# Patient Record
Sex: FEMALE | Race: WHITE | NOT HISPANIC OR LATINO | URBAN - METROPOLITAN AREA
[De-identification: names, ages, dates, MRNs, and addresses within clinical notes are randomized per-mention and may not be internally consistent; named-entity substitution may affect disease eponyms.]

---

## 2023-03-06 ENCOUNTER — TELEPHONE (OUTPATIENT)
Dept: PEDIATRICS | Facility: CLINIC | Age: 18
End: 2023-03-06
Payer: COMMERCIAL

## 2023-03-09 PROBLEM — Q76.3 CONGENITAL SCOLIOSIS DUE TO ANOMALY OF VERTEBRA: Status: ACTIVE | Noted: 2023-03-09

## 2023-03-09 PROBLEM — J45.909 REACTIVE AIRWAY DISEASE (HHS-HCC): Status: ACTIVE | Noted: 2023-03-09

## 2023-03-09 PROBLEM — N94.6 MENSTRUAL CRAMPS: Status: ACTIVE | Noted: 2023-03-09

## 2023-03-09 PROBLEM — Z20.822 ENCOUNTER FOR LABORATORY TESTING FOR COVID-19 VIRUS: Status: ACTIVE | Noted: 2023-03-09

## 2023-03-09 PROBLEM — H10.13 ALLERGIC CONJUNCTIVITIS OF BOTH EYES: Status: ACTIVE | Noted: 2023-03-09

## 2023-03-09 PROBLEM — K22.2 ESOPHAGEAL STENOSIS: Status: ACTIVE | Noted: 2023-03-09

## 2023-03-09 PROBLEM — J30.9 ALLERGIC RHINITIS: Status: ACTIVE | Noted: 2023-03-09

## 2023-03-09 RX ORDER — FLUTICASONE PROPIONATE 50 MCG
1 SPRAY, SUSPENSION (ML) NASAL DAILY
COMMUNITY
Start: 2021-05-26

## 2023-03-09 RX ORDER — MONTELUKAST SODIUM 10 MG/1
10 TABLET ORAL DAILY
COMMUNITY
Start: 2017-04-26 | End: 2023-05-02 | Stop reason: ALTCHOICE

## 2023-03-09 RX ORDER — ALBUTEROL SULFATE 90 UG/1
2 AEROSOL, METERED RESPIRATORY (INHALATION) EVERY 6 HOURS PRN
COMMUNITY
Start: 2018-01-03

## 2023-03-09 RX ORDER — DESOGESTREL AND ETHINYL ESTRADIOL 0.15-0.03
1 KIT ORAL DAILY
COMMUNITY
Start: 2022-06-13 | End: 2023-05-13 | Stop reason: SDUPTHER

## 2023-03-28 ENCOUNTER — TELEPHONE (OUTPATIENT)
Dept: PEDIATRICS | Facility: CLINIC | Age: 18
End: 2023-03-28
Payer: COMMERCIAL

## 2023-04-10 ENCOUNTER — APPOINTMENT (OUTPATIENT)
Dept: PEDIATRICS | Facility: CLINIC | Age: 18
End: 2023-04-10
Payer: COMMERCIAL

## 2023-04-29 PROBLEM — Z20.822 ENCOUNTER FOR LABORATORY TESTING FOR COVID-19 VIRUS: Status: RESOLVED | Noted: 2023-03-09 | Resolved: 2023-04-29

## 2023-04-29 PROBLEM — Q76.3 CONGENITAL SCOLIOSIS DUE TO ANOMALY OF VERTEBRA: Status: RESOLVED | Noted: 2023-03-09 | Resolved: 2023-04-29

## 2023-04-29 RX ORDER — NORETHINDRONE ACETATE AND ETHINYL ESTRADIOL, AND FERROUS FUMARATE 1.5-30(21)
1 KIT ORAL DAILY
COMMUNITY
Start: 2023-03-13 | End: 2023-05-02 | Stop reason: ALTCHOICE

## 2023-05-02 ENCOUNTER — OFFICE VISIT (OUTPATIENT)
Dept: PEDIATRICS | Facility: CLINIC | Age: 18
End: 2023-05-02
Payer: COMMERCIAL

## 2023-05-02 VITALS
HEART RATE: 98 BPM | WEIGHT: 127 LBS | HEIGHT: 62 IN | DIASTOLIC BLOOD PRESSURE: 80 MMHG | SYSTOLIC BLOOD PRESSURE: 117 MMHG | BODY MASS INDEX: 23.37 KG/M2

## 2023-05-02 DIAGNOSIS — L70.9 ACNE, UNSPECIFIED ACNE TYPE: ICD-10-CM

## 2023-05-02 DIAGNOSIS — Z00.00 WELLNESS EXAMINATION: Primary | ICD-10-CM

## 2023-05-02 PROCEDURE — 99395 PREV VISIT EST AGE 18-39: CPT | Performed by: PEDIATRICS

## 2023-05-02 RX ORDER — CLINDAMYCIN PHOSPHATE AND BENZOYL PEROXIDE 10; 50 MG/G; MG/G
GEL TOPICAL
Qty: 45 G | Refills: 11 | Status: SHIPPED | OUTPATIENT
Start: 2023-05-02

## 2023-05-02 NOTE — PROGRESS NOTES
"Subjective   Patient ID: Anjali Harrell is a 18 y.o. female who presents for well child visit    Nutrition: healthy diet  Sleep: no issues  School;  performing well.  No academic or behavioral concerns.  At [facility] program  Menstruation: regular.  Cramps better with OCPs  Work:  [employer]  Sports/activities:  Smoking/vaping: no  Drug use: no  Sexually active? Yes.  Uses OCPs and condoms    Other: last esophageal dilation 4/22.  Has yearly followup with GI      Objective   /80   Pulse 98   Ht 1.575 m (5' 2\")   Wt 57.6 kg (127 lb)   BMI 23.23 kg/m²   BSA: 1.59 meters squared  Growth percentiles: Facility age limit for growth %deangelo is 20 years. Facility age limit for growth %deangelo is 20 years.     Physical Exam  Constitutional:       General: She is not in acute distress.  HENT:      Right Ear: Tympanic membrane normal.      Left Ear: Tympanic membrane normal.      Mouth/Throat:      Pharynx: Oropharynx is clear.   Eyes:      Conjunctiva/sclera: Conjunctivae normal.   Cardiovascular:      Rate and Rhythm: Normal rate.      Heart sounds: No murmur heard.  Pulmonary:      Effort: No respiratory distress.      Breath sounds: Normal breath sounds.   Abdominal:      Palpations: There is no mass.   Musculoskeletal:         General: Normal range of motion.   Lymphadenopathy:      Cervical: No cervical adenopathy.   Skin:     Findings: No rash.   Neurological:      General: No focal deficit present.      Mental Status: She is alert.         Assessment/Plan   Healthy young adult  Vaccines:  due for Tdap but has caresource so cannot get here today  Allergies are good off singulair  Followed by GI for esophageal issues  Discussed transition to adult medical care  Pt concerned about adhd.  [facility]  Acne: trial duac gel qhs  Discussed healthy diet and exercise      Yovany George MD     "

## 2023-05-13 DIAGNOSIS — N94.6 MENSTRUAL CRAMPS: Primary | ICD-10-CM

## 2023-05-13 RX ORDER — DESOGESTREL AND ETHINYL ESTRADIOL 0.15-0.03
1 KIT ORAL DAILY
Qty: 28 TABLET | Refills: 11 | Status: SHIPPED | OUTPATIENT
Start: 2023-05-13 | End: 2024-04-15 | Stop reason: SDUPTHER

## 2023-06-15 ENCOUNTER — OFFICE VISIT (OUTPATIENT)
Dept: PEDIATRICS | Facility: CLINIC | Age: 18
End: 2023-06-15
Payer: COMMERCIAL

## 2023-06-15 ENCOUNTER — TELEPHONE (OUTPATIENT)
Dept: PEDIATRICS | Facility: CLINIC | Age: 18
End: 2023-06-15

## 2023-06-15 VITALS — BODY MASS INDEX: 22.28 KG/M2 | TEMPERATURE: 99.8 F | WEIGHT: 121.8 LBS

## 2023-06-15 DIAGNOSIS — J32.9 SINUSITIS, UNSPECIFIED CHRONICITY, UNSPECIFIED LOCATION: ICD-10-CM

## 2023-06-15 DIAGNOSIS — B34.9 VIRAL ILLNESS: Primary | ICD-10-CM

## 2023-06-15 PROBLEM — K20.0 EOSINOPHILIC ESOPHAGITIS: Status: ACTIVE | Noted: 2022-01-27

## 2023-06-15 PROBLEM — M41.9 SCOLIOSIS: Status: ACTIVE | Noted: 2021-12-27

## 2023-06-15 PROBLEM — G43.909 MIGRAINE WITHOUT STATUS MIGRAINOSUS, NOT INTRACTABLE: Status: ACTIVE | Noted: 2021-12-27

## 2023-06-15 PROBLEM — J45.20 MILD INTERMITTENT ASTHMA WITHOUT COMPLICATION (HHS-HCC): Status: ACTIVE | Noted: 2021-12-27

## 2023-06-15 LAB — POC RAPID STREP: NEGATIVE

## 2023-06-15 PROCEDURE — 87651 STREP A DNA AMP PROBE: CPT

## 2023-06-15 PROCEDURE — 99213 OFFICE O/P EST LOW 20 MIN: CPT | Performed by: STUDENT IN AN ORGANIZED HEALTH CARE EDUCATION/TRAINING PROGRAM

## 2023-06-15 PROCEDURE — 87880 STREP A ASSAY W/OPTIC: CPT | Performed by: STUDENT IN AN ORGANIZED HEALTH CARE EDUCATION/TRAINING PROGRAM

## 2023-06-15 RX ORDER — PANTOPRAZOLE SODIUM 40 MG/1
40 TABLET, DELAYED RELEASE ORAL 2 TIMES DAILY
COMMUNITY
Start: 2021-11-27

## 2023-06-15 RX ORDER — MONTELUKAST SODIUM 10 MG/1
10 TABLET ORAL NIGHTLY
COMMUNITY

## 2023-06-15 RX ORDER — FLUTICASONE PROPIONATE 50 MCG
1 SPRAY, SUSPENSION (ML) NASAL DAILY
COMMUNITY
End: 2024-04-22 | Stop reason: ALTCHOICE

## 2023-06-15 RX ORDER — DIPHENHYDRAMINE HCL 25 MG
50 TABLET ORAL EVERY 6 HOURS PRN
COMMUNITY
Start: 2017-06-16 | End: 2024-04-26 | Stop reason: ALTCHOICE

## 2023-06-15 RX ORDER — AMOXICILLIN AND CLAVULANATE POTASSIUM 875; 125 MG/1; MG/1
1 TABLET, FILM COATED ORAL 2 TIMES DAILY
Qty: 20 TABLET | Refills: 0 | Status: SHIPPED | OUTPATIENT
Start: 2023-06-15 | End: 2023-06-16

## 2023-06-15 NOTE — PROGRESS NOTES
"Subjective   Patient ID: Anjali Harrell is a 18 y.o. female who presents for Cough and Nasal Congestion.    Sick for 3-4 days  Sore throat, congestion, cough  Using albuterol inhaler without relief (last albuterol yesterday evening)  Coughing up mucus  Temperature 99  Drinking okay, urinating normally  Energy is \"terrible\" - can't get out of bed      Objective   Temp 37.7 °C (99.8 °F)   Wt 55.2 kg (121 lb 12.8 oz)   BMI 22.28 kg/m²   BSA: 1.55 meters squared  Growth percentiles: Facility age limit for growth %deangelo is 20 years. Facility age limit for growth %deangelo is 20 years.     Physical Exam  Constitutional:       Appearance: Normal appearance.   HENT:      Head: Normocephalic and atraumatic.      Right Ear: Tympanic membrane normal.      Left Ear: Tympanic membrane normal.      Nose: Nose normal.      Mouth/Throat:      Mouth: Mucous membranes are moist.   Eyes:      Conjunctiva/sclera: Conjunctivae normal.   Cardiovascular:      Heart sounds: Normal heart sounds. No murmur heard.  Pulmonary:      Effort: Pulmonary effort is normal.      Breath sounds: Normal breath sounds. No wheezing, rhonchi or rales.   Abdominal:      General: Abdomen is flat. Bowel sounds are normal.      Palpations: Abdomen is soft.   Musculoskeletal:      Cervical back: Normal range of motion and neck supple.   Neurological:      Mental Status: She is alert.               Assessment/Plan   18 y.o., otherwise healthy female presenting with viral illness. Discussed supportive care, reasons to return to care. If no improvement after 1 week of illness will consider treating as sinus infection.   Rapid strep negative; will follow PCR    ADDENDUM: Mom called back and informed me that symptoms have been going on for two weeks, and getting worse recently. In this case, will treat as sinusitis with augmentin. Call with any worsening symptoms or new concerns.     ADDENDUM: Received a call that lab did not receive strep test. Given that patient " is on augmentin, which would cover strep throat should test be positive, no need to repeat.     ADDENDUM: Despite communication from lab, PCR has resulted and is negative.     Problem List Items Addressed This Visit    None      Senia Caldwell MD

## 2023-06-16 ENCOUNTER — TELEPHONE (OUTPATIENT)
Dept: PEDIATRICS | Facility: CLINIC | Age: 18
End: 2023-06-16
Payer: COMMERCIAL

## 2023-06-16 DIAGNOSIS — R05.1 ACUTE COUGH: Primary | ICD-10-CM

## 2023-06-16 LAB — GROUP A STREP, PCR: NOT DETECTED

## 2023-06-16 RX ORDER — AMOXICILLIN AND CLAVULANATE POTASSIUM 600; 42.9 MG/5ML; MG/5ML
POWDER, FOR SUSPENSION ORAL
Qty: 145 ML | Refills: 0 | Status: SHIPPED | OUTPATIENT
Start: 2023-06-16 | End: 2024-04-22 | Stop reason: ALTCHOICE

## 2023-09-20 ENCOUNTER — OFFICE VISIT (OUTPATIENT)
Dept: PEDIATRICS | Facility: CLINIC | Age: 18
End: 2023-09-20
Payer: COMMERCIAL

## 2023-09-20 VITALS
HEIGHT: 63 IN | HEART RATE: 105 BPM | WEIGHT: 115.7 LBS | DIASTOLIC BLOOD PRESSURE: 71 MMHG | SYSTOLIC BLOOD PRESSURE: 98 MMHG | BODY MASS INDEX: 20.5 KG/M2

## 2023-09-20 DIAGNOSIS — J02.9 VIRAL PHARYNGITIS: Primary | ICD-10-CM

## 2023-09-20 DIAGNOSIS — R59.9 REACTIVE LYMPHADENOPATHY: ICD-10-CM

## 2023-09-20 LAB
GROUP A STREP, PCR: NOT DETECTED
POC RAPID STREP: NEGATIVE

## 2023-09-20 PROCEDURE — 87651 STREP A DNA AMP PROBE: CPT

## 2023-09-20 PROCEDURE — 87880 STREP A ASSAY W/OPTIC: CPT | Performed by: PEDIATRICS

## 2023-09-20 PROCEDURE — 99213 OFFICE O/P EST LOW 20 MIN: CPT | Performed by: PEDIATRICS

## 2023-09-20 NOTE — PROGRESS NOTES
"Subjective   Patient ID: Anjali Harrell is a 18 y.o. female who presents for Well Child.  Today she is accompanied by alone.     HPI    ILLNESS X 1 WEEK    Began with congestion- resolved  Now terrible sore throat  No fevers  Yesterday started with swollen neck  A bit better today but not resolved    Review of systems negative unless otherwise indicated in HPI    Objective   BP 98/71   Pulse 105   Ht 1.588 m (5' 2.5\")   Wt 52.5 kg (115 lb 11.2 oz)   BMI 20.82 kg/m²     Physical Exam  General: alert, active, in no acute distress  Hydration: well-hydrated, mucous membranes moist, good skin turgor  Eyes: conjunctiva clear  Ears: TM's normal, external auditory canals are clear   Nose: clear, no discharge  Throat: moist mucous membranes with MODERATE erythema, NO exudates or petechiae, no post-nasal drainage seen  Neck: Mild B lymphadenopathy- R > L both < 2cm  Lungs: clear to auscultation, no wheezing, crackles or rhonchi, breathing unlabored  Heart: Normal PMI. regular rate and rhythm, normal S1, S2, no murmurs or gallops.     Assessment/Plan   Problem List Items Addressed This Visit    None  Visit Diagnoses       Viral pharyngitis    -  Primary    Relevant Orders    Group A Streptococcus, PCR    POCT rapid strep A manually resulted    Reactive lymphadenopathy              Viral pharyngitis with reactive LAD  Supportive Care  Call if worse, not improved, new fever  Strep PCR  Exam NOT classic for mono but offered testing- pt refused  Janell Shelton MD   "

## 2023-11-30 DIAGNOSIS — J30.9 ALLERGIC RHINITIS, UNSPECIFIED SEASONALITY, UNSPECIFIED TRIGGER: Primary | ICD-10-CM

## 2024-01-08 ENCOUNTER — OFFICE VISIT (OUTPATIENT)
Dept: PEDIATRICS | Facility: CLINIC | Age: 19
End: 2024-01-08
Payer: COMMERCIAL

## 2024-01-08 VITALS — BODY MASS INDEX: 21.08 KG/M2 | WEIGHT: 117.1 LBS

## 2024-01-08 DIAGNOSIS — M25.512 ACUTE PAIN OF LEFT SHOULDER: Primary | ICD-10-CM

## 2024-01-08 PROCEDURE — 99213 OFFICE O/P EST LOW 20 MIN: CPT | Performed by: PEDIATRICS

## 2024-01-08 PROCEDURE — A4565 SLINGS: HCPCS | Performed by: PEDIATRICS

## 2024-01-08 NOTE — LETTER
January 8, 2024     Patient: Anjali Harrell   YOB: 2005   Date of Visit: 1/8/2024       To Whom It May Concern:    Anjali Harrell was seen in my clinic on 1/8/2024 at 11:30 am. Please excuse Anjali for her absence from work on this day to make the appointment.    Anjali has been instructed not to use her left shoulder until seen by an orthopedic surgeon.    If you have any questions or concerns, please don't hesitate to call.         Sincerely,         Janell Shelton MD        CC: No Recipients

## 2024-01-08 NOTE — PROGRESS NOTES
Subjective   Patient ID: Anjali Harrell is a 18 y.o. female who presents for Shoulder Pain.  Today she is accompanied by alone.     HPI    Left should pain x 3 weeks  No injury  Works at [employer]  Sander has her working an area that requires her to lift her left arm often to reach something  Pt feels this is the reason for pain  It is a repetitive movement she is doing all day at work  Occasional Tingling in hand  Occasionally the pain wakes her from sleep  Normal range of motion  Hurts to raise her arm      Review of systems negative unless otherwise indicated in HPI    Objective   Wt 53.1 kg (117 lb 1.6 oz)   BMI 21.08 kg/m²     Physical Exam  General: alert, active, in no acute distress  Lungs: clear to auscultation, no wheezing, crackles or rhonchi, breathing unlabored  Heart: Normal PMI. regular rate and rhythm, normal S1, S2, no murmurs or gallops.   Left shoulder: normal on inspection/looks symmetrical to right.  No bruising.  Full ROM.  Increased pain when asked to reach up.  Points to shoulder blade as source of pain.  Strength 4/5 on testing in shoulder.  Attributed to pain.  Normal elbow and wrist strength    Assessment/Plan   Problem List Items Addressed This Visit    None  Visit Diagnoses       Acute pain of left shoulder    -  Primary    Relevant Orders    Referral to Orthopaedic Surgery          Left shoulder pain  Ortho  Placed in sling today- advised not to sleep in it  Note for work that she should not use left shoulder until seen by ortho    Janell Shelton MD

## 2024-01-24 ENCOUNTER — OFFICE VISIT (OUTPATIENT)
Dept: ORTHOPEDIC SURGERY | Facility: HOSPITAL | Age: 19
End: 2024-01-24
Payer: COMMERCIAL

## 2024-01-24 ENCOUNTER — HOSPITAL ENCOUNTER (OUTPATIENT)
Dept: RADIOLOGY | Facility: HOSPITAL | Age: 19
Discharge: HOME | End: 2024-01-24
Payer: COMMERCIAL

## 2024-01-24 DIAGNOSIS — M25.512 LEFT SHOULDER PAIN, UNSPECIFIED CHRONICITY: ICD-10-CM

## 2024-01-24 DIAGNOSIS — M25.512 ACUTE PAIN OF LEFT SHOULDER: ICD-10-CM

## 2024-01-24 PROCEDURE — 99214 OFFICE O/P EST MOD 30 MIN: CPT | Performed by: ORTHOPAEDIC SURGERY

## 2024-01-24 PROCEDURE — 2500000004 HC RX 250 GENERAL PHARMACY W/ HCPCS (ALT 636 FOR OP/ED): Performed by: ORTHOPAEDIC SURGERY

## 2024-01-24 PROCEDURE — 73030 X-RAY EXAM OF SHOULDER: CPT | Mod: LT

## 2024-01-24 PROCEDURE — 73030 X-RAY EXAM OF SHOULDER: CPT | Mod: LEFT SIDE | Performed by: RADIOLOGY

## 2024-01-24 PROCEDURE — 99204 OFFICE O/P NEW MOD 45 MIN: CPT | Performed by: ORTHOPAEDIC SURGERY

## 2024-01-24 PROCEDURE — 20610 DRAIN/INJ JOINT/BURSA W/O US: CPT | Performed by: ORTHOPAEDIC SURGERY

## 2024-01-24 PROCEDURE — 2500000005 HC RX 250 GENERAL PHARMACY W/O HCPCS: Performed by: ORTHOPAEDIC SURGERY

## 2024-01-24 RX ORDER — LIDOCAINE HYDROCHLORIDE 10 MG/ML
4 INJECTION INFILTRATION; PERINEURAL
Status: COMPLETED | OUTPATIENT
Start: 2024-01-24 | End: 2024-01-24

## 2024-01-24 RX ORDER — TRIAMCINOLONE ACETONIDE 40 MG/ML
40 INJECTION, SUSPENSION INTRA-ARTICULAR; INTRAMUSCULAR
Status: COMPLETED | OUTPATIENT
Start: 2024-01-24 | End: 2024-01-24

## 2024-01-24 RX ADMIN — TRIAMCINOLONE ACETONIDE 40 MG: 400 INJECTION, SUSPENSION INTRA-ARTICULAR; INTRAMUSCULAR at 13:24

## 2024-01-24 RX ADMIN — LIDOCAINE HYDROCHLORIDE 4 ML: 10 INJECTION, SOLUTION INFILTRATION; PERINEURAL at 13:24

## 2024-01-24 NOTE — PROGRESS NOTES
Patient has left shoulder pain around the trapezius worse with elevation and disruptive of her sleep no neck pain.  The pain does go down her arm to her left hand at times.  She is not affecting paresthesias or weakness.  She has a job that requires repetitive overhead use of her arms.    The patient is pleasant and cooperative.  The patient is alert and oriented ×3.  Auditory function is intact.  The patient is a good historian.  The patient is not in acute distress.  Eye exam significant for nonicteric sclera, intact ocular muscle movement.  Breathing is rhythmic symmetric and nonlabored.  Left upper extremity integument intact no lesions scars laceration abrasions or contusions left radial pulse palpable brisk capillary refill light touch sensation intact over the shoulder arm forearm and hand cervical spine motion is painless including Spurling's which is negative.  Patient has nearly full range of motion of her shoulder but she has a painful arc of motion external rotation abduction painful internal rotation abduction painful motor power abduction 5 external rotation 5 internal rotation 5  strength 5 also painful on resisted motion.  No apprehension.    Radiographs appear normal no fracture dislocation subluxation or arthritic degenerative changes.    Left shoulder pain    Patient has left shoulder pain differential diagnosis includes cervical radiculitis, subacromial impingement and trapezial strain.  Favoring diagnosis of subacromial origin of pain, we recommended subacromial injection injection was performed today.  Patient has been asked to call in 1 week to report results of this.  If she does not get relief of most likely like to initiate another course of anti-inflammatory medicine and physical therapy before further imaging.    This was dictated using voice recognition software and not corrected for grammatical or spelling errors.  L Inj/Asp: L subacromial bursa on 1/24/2024 1:24 PM  Indications:  pain  Details: 22 G needle, posterior approach  Medications: 40 mg triamcinolone acetonide 40 mg/mL; 4 mL lidocaine 10 mg/mL (1 %)  Procedure, treatment alternatives, risks and benefits explained, specific risks discussed. Consent was given by the patient.

## 2024-01-24 NOTE — LETTER
January 24, 2024     Patient: Anjali Harrell   YOB: 2005   Date of Visit: 1/24/2024       To Whom It May Concern:    Anjali Harrell was seen in my clinic for her left shoulder on 1/24/24.     If you have any questions or concerns, please don't hesitate to call.         Sincerely,        Kenji Hobson MD    CC: No Recipients

## 2024-04-15 DIAGNOSIS — N94.6 MENSTRUAL CRAMPS: ICD-10-CM

## 2024-04-15 RX ORDER — DESOGESTREL AND ETHINYL ESTRADIOL 0.15-0.03
1 KIT ORAL DAILY
Qty: 28 TABLET | Refills: 11 | Status: SHIPPED | OUTPATIENT
Start: 2024-04-15

## 2024-04-22 PROBLEM — J45.20 MILD INTERMITTENT ASTHMA WITHOUT COMPLICATION (HHS-HCC): Status: RESOLVED | Noted: 2021-12-27 | Resolved: 2024-04-22

## 2024-04-26 ENCOUNTER — OFFICE VISIT (OUTPATIENT)
Dept: PEDIATRICS | Facility: CLINIC | Age: 19
End: 2024-04-26
Payer: COMMERCIAL

## 2024-04-26 VITALS
HEART RATE: 71 BPM | HEIGHT: 62 IN | SYSTOLIC BLOOD PRESSURE: 124 MMHG | DIASTOLIC BLOOD PRESSURE: 82 MMHG | BODY MASS INDEX: 21.31 KG/M2 | WEIGHT: 115.8 LBS

## 2024-04-26 DIAGNOSIS — Z00.00 WELLNESS EXAMINATION: Primary | ICD-10-CM

## 2024-04-26 PROCEDURE — 99395 PREV VISIT EST AGE 18-39: CPT | Performed by: PEDIATRICS

## 2024-04-26 NOTE — PROGRESS NOTES
"Subjective   Patient ID: Anjali Harrell is a 19 y.o. female who presents for well child visit    Nutrition: healthy diet  Sleep: no issues  School;  performing well.  No academic or behavioral concerns    Graduated college.  Starting radiology technician program at [employer]     Living at home  Menstruation: regular menses.  On OCPs  Work:   Sports/activities:  Smoking/vaping: no  Drug use: no  Sexually active? No     Other:      Objective   /82   Pulse 71   Ht 1.562 m (5' 1.5\")   Wt 52.5 kg (115 lb 12.8 oz)   BMI 21.53 kg/m²   BSA: 1.51 meters squared  Growth percentiles: Facility age limit for growth %deangelo is 20 years. Facility age limit for growth %deangelo is 20 years.     Physical Exam  Constitutional:       General: She is not in acute distress.  HENT:      Right Ear: Tympanic membrane normal.      Left Ear: Tympanic membrane normal.      Mouth/Throat:      Pharynx: Oropharynx is clear.   Eyes:      Conjunctiva/sclera: Conjunctivae normal.   Cardiovascular:      Rate and Rhythm: Normal rate.      Heart sounds: No murmur heard.  Pulmonary:      Effort: No respiratory distress.      Breath sounds: Normal breath sounds.   Abdominal:      Palpations: There is no mass.   Musculoskeletal:         General: Normal range of motion.   Lymphadenopathy:      Cervical: No cervical adenopathy.   Skin:     Findings: No rash.   Neurological:      General: No focal deficit present.      Mental Status: She is alert.         Assessment/Plan   Healthy young adult  Vaccines:  up to date for age  Periods regulated on OCPs  Asthma: mild intermittent  Discussed healthy diet and exercise      Yovany George MD       "

## 2024-06-07 DIAGNOSIS — J30.9 ALLERGIC RHINITIS, UNSPECIFIED SEASONALITY, UNSPECIFIED TRIGGER: ICD-10-CM

## 2024-07-08 ENCOUNTER — APPOINTMENT (OUTPATIENT)
Dept: RADIOLOGY | Facility: HOSPITAL | Age: 19
End: 2024-07-08
Payer: COMMERCIAL

## 2024-07-08 ENCOUNTER — HOSPITAL ENCOUNTER (EMERGENCY)
Facility: HOSPITAL | Age: 19
Discharge: HOME | End: 2024-07-08
Attending: STUDENT IN AN ORGANIZED HEALTH CARE EDUCATION/TRAINING PROGRAM
Payer: COMMERCIAL

## 2024-07-08 VITALS
TEMPERATURE: 98.2 F | RESPIRATION RATE: 20 BRPM | SYSTOLIC BLOOD PRESSURE: 129 MMHG | OXYGEN SATURATION: 97 % | BODY MASS INDEX: 23 KG/M2 | WEIGHT: 125 LBS | HEIGHT: 62 IN | HEART RATE: 86 BPM | DIASTOLIC BLOOD PRESSURE: 82 MMHG

## 2024-07-08 DIAGNOSIS — R55 SYNCOPE, UNSPECIFIED SYNCOPE TYPE: Primary | ICD-10-CM

## 2024-07-08 LAB
ALBUMIN SERPL BCP-MCNC: 4.5 G/DL (ref 3.4–5)
ALP SERPL-CCNC: 57 U/L (ref 33–110)
ALT SERPL W P-5'-P-CCNC: 15 U/L (ref 7–45)
ANION GAP SERPL CALC-SCNC: 12 MMOL/L (ref 10–20)
AST SERPL W P-5'-P-CCNC: 13 U/L (ref 9–39)
B-HCG SERPL-ACNC: <2 MIU/ML
BASOPHILS # BLD AUTO: 0.15 X10*3/UL (ref 0–0.1)
BASOPHILS NFR BLD AUTO: 1.3 %
BILIRUB SERPL-MCNC: 0.6 MG/DL (ref 0–1.2)
BUN SERPL-MCNC: 13 MG/DL (ref 6–23)
CALCIUM SERPL-MCNC: 9.7 MG/DL (ref 8.6–10.3)
CARDIAC TROPONIN I PNL SERPL HS: <3 NG/L (ref 0–13)
CHLORIDE SERPL-SCNC: 101 MMOL/L (ref 98–107)
CO2 SERPL-SCNC: 29 MMOL/L (ref 21–32)
CREAT SERPL-MCNC: 0.63 MG/DL (ref 0.5–1.05)
EGFRCR SERPLBLD CKD-EPI 2021: >90 ML/MIN/1.73M*2
EOSINOPHIL # BLD AUTO: 0.79 X10*3/UL (ref 0–0.7)
EOSINOPHIL NFR BLD AUTO: 6.9 %
ERYTHROCYTE [DISTWIDTH] IN BLOOD BY AUTOMATED COUNT: 12.3 % (ref 11.5–14.5)
GLUCOSE SERPL-MCNC: 90 MG/DL (ref 74–99)
HCT VFR BLD AUTO: 42.9 % (ref 36–46)
HGB BLD-MCNC: 14.7 G/DL (ref 12–16)
IMM GRANULOCYTES # BLD AUTO: 0.03 X10*3/UL (ref 0–0.7)
IMM GRANULOCYTES NFR BLD AUTO: 0.3 % (ref 0–0.9)
LYMPHOCYTES # BLD AUTO: 1.96 X10*3/UL (ref 1.2–4.8)
LYMPHOCYTES NFR BLD AUTO: 17 %
MAGNESIUM SERPL-MCNC: 1.96 MG/DL (ref 1.6–2.4)
MCH RBC QN AUTO: 30.5 PG (ref 26–34)
MCHC RBC AUTO-ENTMCNC: 34.3 G/DL (ref 32–36)
MCV RBC AUTO: 89 FL (ref 80–100)
MONOCYTES # BLD AUTO: 0.91 X10*3/UL (ref 0.1–1)
MONOCYTES NFR BLD AUTO: 7.9 %
NEUTROPHILS # BLD AUTO: 7.66 X10*3/UL (ref 1.2–7.7)
NEUTROPHILS NFR BLD AUTO: 66.6 %
NRBC BLD-RTO: 0 /100 WBCS (ref 0–0)
PLATELET # BLD AUTO: 320 X10*3/UL (ref 150–450)
POTASSIUM SERPL-SCNC: 4.4 MMOL/L (ref 3.5–5.3)
PROT SERPL-MCNC: 7.6 G/DL (ref 6.4–8.2)
RBC # BLD AUTO: 4.82 X10*6/UL (ref 4–5.2)
SARS-COV-2 RNA RESP QL NAA+PROBE: NOT DETECTED
SODIUM SERPL-SCNC: 138 MMOL/L (ref 136–145)
WBC # BLD AUTO: 11.5 X10*3/UL (ref 4.4–11.3)

## 2024-07-08 PROCEDURE — 71045 X-RAY EXAM CHEST 1 VIEW: CPT

## 2024-07-08 PROCEDURE — 2500000004 HC RX 250 GENERAL PHARMACY W/ HCPCS (ALT 636 FOR OP/ED): Performed by: STUDENT IN AN ORGANIZED HEALTH CARE EDUCATION/TRAINING PROGRAM

## 2024-07-08 PROCEDURE — 96361 HYDRATE IV INFUSION ADD-ON: CPT

## 2024-07-08 PROCEDURE — 84702 CHORIONIC GONADOTROPIN TEST: CPT | Performed by: PHYSICIAN ASSISTANT

## 2024-07-08 PROCEDURE — 87635 SARS-COV-2 COVID-19 AMP PRB: CPT | Performed by: STUDENT IN AN ORGANIZED HEALTH CARE EDUCATION/TRAINING PROGRAM

## 2024-07-08 PROCEDURE — 36415 COLL VENOUS BLD VENIPUNCTURE: CPT | Performed by: PHYSICIAN ASSISTANT

## 2024-07-08 PROCEDURE — 84484 ASSAY OF TROPONIN QUANT: CPT | Performed by: STUDENT IN AN ORGANIZED HEALTH CARE EDUCATION/TRAINING PROGRAM

## 2024-07-08 PROCEDURE — 83735 ASSAY OF MAGNESIUM: CPT | Performed by: PHYSICIAN ASSISTANT

## 2024-07-08 PROCEDURE — 71045 X-RAY EXAM CHEST 1 VIEW: CPT | Performed by: RADIOLOGY

## 2024-07-08 PROCEDURE — 85025 COMPLETE CBC W/AUTO DIFF WBC: CPT | Performed by: PHYSICIAN ASSISTANT

## 2024-07-08 PROCEDURE — 70450 CT HEAD/BRAIN W/O DYE: CPT | Performed by: RADIOLOGY

## 2024-07-08 PROCEDURE — 96374 THER/PROPH/DIAG INJ IV PUSH: CPT

## 2024-07-08 PROCEDURE — 99285 EMERGENCY DEPT VISIT HI MDM: CPT | Mod: 25

## 2024-07-08 PROCEDURE — 70450 CT HEAD/BRAIN W/O DYE: CPT

## 2024-07-08 PROCEDURE — 80053 COMPREHEN METABOLIC PANEL: CPT | Performed by: PHYSICIAN ASSISTANT

## 2024-07-08 RX ORDER — KETOROLAC TROMETHAMINE 30 MG/ML
15 INJECTION, SOLUTION INTRAMUSCULAR; INTRAVENOUS ONCE
Status: COMPLETED | OUTPATIENT
Start: 2024-07-08 | End: 2024-07-08

## 2024-07-08 ASSESSMENT — COLUMBIA-SUICIDE SEVERITY RATING SCALE - C-SSRS
6. HAVE YOU EVER DONE ANYTHING, STARTED TO DO ANYTHING, OR PREPARED TO DO ANYTHING TO END YOUR LIFE?: NO
2. HAVE YOU ACTUALLY HAD ANY THOUGHTS OF KILLING YOURSELF?: NO
1. IN THE PAST MONTH, HAVE YOU WISHED YOU WERE DEAD OR WISHED YOU COULD GO TO SLEEP AND NOT WAKE UP?: NO

## 2024-07-08 ASSESSMENT — LIFESTYLE VARIABLES
HAVE YOU EVER FELT YOU SHOULD CUT DOWN ON YOUR DRINKING: NO
EVER FELT BAD OR GUILTY ABOUT YOUR DRINKING: NO
EVER HAD A DRINK FIRST THING IN THE MORNING TO STEADY YOUR NERVES TO GET RID OF A HANGOVER: NO
HAVE PEOPLE ANNOYED YOU BY CRITICIZING YOUR DRINKING: NO
TOTAL SCORE: 0

## 2024-07-08 NOTE — ED PROVIDER NOTES
EMERGENCY MEDICINE EVALUATION NOTE    History of Present Illness     Chief Complaint:   Chief Complaint   Patient presents with    Syncope     Pt arrives private auto from work. States syncopal episode @ work this afternoon. Denies medical hx. States felt light-headed prior to this happening. States she may have hit head when falling.        HPI: Anjali Harrell is a 19 y.o. female who presents with complaint of syncope.  Patient states she woke up this morning and throughout the day she has had intermittent hot flashes with subjective fevers and chills.  She also admits 1 week of rhinorrhea and congestion.  She states she did not feel well all throughout today and while she was at work she attempted to lay her head down and then passed out for an unknown duration of time.  States she woke up on the floor.  She does admit to head trauma and some mild tenderness to the right lateral scalp.  She denies any neck pain, chest pain, current pregnancy, dysuria, hematuria.  States she did have some watery diarrhea although denies any abdominal pain.  She states she did have some intermittent shortness of breath throughout the day.    Previous History     Past Medical History:   Diagnosis Date    Carbuncle, unspecified 11/09/2018    Recurrent boils    Chronic sinusitis, unspecified 05/22/2018    Frequent sinus infections    Concussion without loss of consciousness, initial encounter 01/25/2017    Concussion without loss of consciousness, initial encounter    Congenital scoliosis due to anomaly of vertebra 03/09/2023    Cutaneous abscess, unspecified 08/30/2018    Abscess    Displaced fracture (avulsion) of medial epicondyle of left humerus, initial encounter for closed fracture 03/06/2015    Fracture of medial epicondyle of left humerus    Juvenile osteochondrosis of tarsus, 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  No Known Allergies  Current Outpatient Medications   Medication Instructions    albuterol 90 mcg/actuation inhaler 2 puffs, inhalation, Every 6 hours PRN    albuterol sulfate (Proair Digihaler) 90 mcg/actuation aero powdr breath act w/sensor inhaler 2 puffs, inhalation, Every 4 hours PRN    cetirizine (ZYRTEC) 10 mg, oral, Daily    clindamycin-benzoyl peroxide (Duac) 1.2-5% gel Apply nightly    desogestreL-ethinyl estradioL (Apri) 0.15-0.03 mg tablet 1 tablet, oral, Daily    fluticasone (Flonase) 50 mcg/actuation nasal spray 1 spray, nasal, Daily    montelukast (SINGULAIR) 10 mg, oral, Nightly    pantoprazole (PROTONIX) 40 mg, oral, 2 times daily       Physical Exam     Appearance: Alert, oriented , cooperative,  in acute distress. Well nourished & well hydrated.     Skin: Intact,  dry skin, no lesions, rash, petechiae or purpura.      Eyes: PERRLA, EOMs intact,  Conjunctiva pink with no redness or exudates. Cornea & anterior chamber are clear, Eyelids without lesions. No scleral icterus.      ENT: Hearing grossly intact. External auditory canals patent, tympanic membranes intact with visible landmarks. Nares patent, mucus membranes moist. Dentition without lesions. Pharynx clear, uvula midline.      Neck: Supple, without meningismus. Thyroid not palpable. Trachea at midline. No lymphadenopathy.     Pulmonary: Clear bilaterally with good chest wall excursion. No rales, rhonchi or wheezing. No accessory muscle use or stridor.     Cardiac: Normal S1, S2 without murmur, rub, gallop or extrasystole. No JVD, Carotids without bruits.     Abdomen: Soft, nontender, active bowel sounds.  No palpable organomegaly.  No rebound  or guarding.  No CVA tenderness.     Genitourinary: Exam deferred.     Musculoskeletal: Full range of motion. no pain, edema, or deformity. Pulses full and equal. No cyanosis or clubbing.      Neurological:  Cranial nerves II through XII are grossly intact, finger-nose touch is normal, normal sensation, no weakness, no focal findings identified.     Psychiatric: Appropriate mood and affect.      Results     Labs Reviewed   CBC WITH AUTO DIFFERENTIAL - Abnormal       Result Value    WBC 11.5 (*)     nRBC 0.0      RBC 4.82      Hemoglobin 14.7      Hematocrit 42.9      MCV 89      MCH 30.5      MCHC 34.3      RDW 12.3      Platelets 320      Neutrophils % 66.6      Immature Granulocytes %, Automated 0.3      Lymphocytes % 17.0      Monocytes % 7.9      Eosinophils % 6.9      Basophils % 1.3      Neutrophils Absolute 7.66      Immature Granulocytes Absolute, Automated 0.03      Lymphocytes Absolute 1.96      Monocytes Absolute 0.91      Eosinophils Absolute 0.79 (*)     Basophils Absolute 0.15 (*)    COMPREHENSIVE METABOLIC PANEL - Normal    Glucose 90      Sodium 138      Potassium 4.4      Chloride 101      Bicarbonate 29      Anion Gap 12      Urea Nitrogen 13      Creatinine 0.63      eGFR >90      Calcium 9.7      Albumin 4.5      Alkaline Phosphatase 57      Total Protein 7.6      AST 13      Bilirubin, Total 0.6      ALT 15     HUMAN CHORIONIC GONADOTROPIN, SERUM QUANTITATIVE - Normal    HCG, Beta-Quantitative <2      Narrative:      Total HCG measurement is performed using the [employer] Access   Immunoassay which detects intact HCG and free beta HCG subunit.    This test is not indicated for use as a tumor marker.   HCG testing is performed using a different test methodology at [facility] than other [facility]. Direct result comparison   should only be made within the same method.       MAGNESIUM - Normal    Magnesium 1.96     SARS-COV-2 PCR - Normal    Coronavirus 2019, PCR Not  Detected      Narrative:     This assay has received FDA Emergency Use Authorization (EUA) and is only authorized for the duration of time that circumstances exist to justify the authorization of the emergency use of in vitro diagnostic tests for the detection of SARS-CoV-2 virus and/or diagnosis of COVID-19 infection under section 564(b)(1) of the Act, 21 U.S.C. 360bbb-3(b)(1). This assay is an in vitro diagnostic nucleic acid amplification test for the qualitative detection of SARS-CoV-2 from nasopharyngeal specimens and has been validated for use at [facility]. Negative results do not preclude COVID-19 infections and should not be used as the sole basis for diagnosis, treatment, or other management decisions.     TROPONIN I, HIGH SENSITIVITY - Normal    Troponin I, High Sensitivity <3      Narrative:     Less than 99th percentile of normal range cutoff-  Female and children under 18 years old <14 ng/L; Male <21 ng/L: Negative  Repeat testing should be performed if clinically indicated.     Female and children under 18 years old 14-50 ng/L; Male 21-50 ng/L:  Consistent with possible cardiac damage and possible increased clinical   risk. Serial measurements may help to assess extent of myocardial damage.     >50 ng/L: Consistent with cardiac damage, increased clinical risk and  myocardial infarction. Serial measurements may help assess extent of   myocardial damage.      NOTE: Children less than 1 year old may have higher baseline troponin   levels and results should be interpreted in conjunction with the overall   clinical context.     NOTE: Troponin I testing is performed using a different   testing methodology at [facility] than at other   [facility]. Direct result comparisons should only   be made within the same method.     CT head wo IV contrast   Final Result   No acute intracranial pathology.                  Signed by: Triston Santos 7/8/2024 3:38 PM   Dictation  "workstation:   [id number]      XR chest 1 view   Final Result   1.  No active cardiopulmonary disease.        Signed by: Mitchell Holly 7/8/2024 2:48 PM   Dictation workstation:   [id number]            ED Course & Medical Decision Making     Medications   sodium chloride 0.9 % bolus 1,000 mL (0 mL intravenous Stopped 7/8/24 1600)   ketorolac (Toradol) injection 15 mg (15 mg intravenous Given 7/8/24 1603)     Diagnoses as of 07/09/24 1010   Syncope, unspecified syncope type     Heart Rate:  [86]   Temperature:  [36.8 °C (98.2 °F)]   Respirations:  [20]   BP: (129)/(82)   Height:  [157.5 cm (5' 2\")]   Weight:  [56.7 kg (125 lb)]   Pulse Ox:  [97 %]      Anjali Harrell is a 19 y.o. female who presents with complaint of syncope.  Patient states she woke up this morning and throughout the day she has had intermittent hot flashes with subjective fevers and chills.  Patient did have reported head trauma after a syncopal episode.  Differential includes was limited to vasovagal syncope, cardiogenic syncope, dehydration, panic disorder.  EKG reviewed within normal sinus rhythm and no significant cardiac arrhythmia or other acute finding.  Rate is from a leukocytosis of 11.5 most likely reactive in nature.  No significant renal dysfunction, lecture abnormality.  Beta-hCG and troponin testing negative.  COVID-19 testing negative.  Possible underlying acute viral illness most likely self-limiting.  Chest x-ray was nonacute.  Patient was still pending CT scan of the head and was handed off to attending pending these findings.  Patient was treated with 1 L normal saline as well as Toradol.  Plan for reevaluation and if CT head negative likely discharge home with cardiology outpatient follow-up.    Procedures   Procedures    Diagnosis     1. Syncope, unspecified syncope type        Disposition     Signed out to the oncoming attending pending final disposition.    ED Prescriptions    None            Darvin Vo, " DO  07/09/24 1011

## 2024-07-08 NOTE — PROGRESS NOTES
"Transition of care note:  Patient was turned over to me from prior provider.  This is a 19-year-old female who presents with syncope.  Workup was obtained which was unremarkable including a high-sensitivity troponin.  CT of the head shows no intracranial findings.  Chest x-ray is unremarkable.  I feel comfortable discharging the patient home to follow-up with her primary care physician.      Diagnoses as of 07/08/24 1646   Syncope, unspecified syncope type      Visit Vitals  /82   Pulse 86   Temp 36.8 °C (98.2 °F)   Resp 20   Ht 1.575 m (5' 2\")   Wt 56.7 kg (125 lb)   SpO2 97%   BMI 22.86 kg/m²   BSA 1.58 m²      Labs Reviewed   CBC WITH AUTO DIFFERENTIAL - Abnormal       Result Value    WBC 11.5 (*)     nRBC 0.0      RBC 4.82      Hemoglobin 14.7      Hematocrit 42.9      MCV 89      MCH 30.5      MCHC 34.3      RDW 12.3      Platelets 320      Neutrophils % 66.6      Immature Granulocytes %, Automated 0.3      Lymphocytes % 17.0      Monocytes % 7.9      Eosinophils % 6.9      Basophils % 1.3      Neutrophils Absolute 7.66      Immature Granulocytes Absolute, Automated 0.03      Lymphocytes Absolute 1.96      Monocytes Absolute 0.91      Eosinophils Absolute 0.79 (*)     Basophils Absolute 0.15 (*)    COMPREHENSIVE METABOLIC PANEL - Normal    Glucose 90      Sodium 138      Potassium 4.4      Chloride 101      Bicarbonate 29      Anion Gap 12      Urea Nitrogen 13      Creatinine 0.63      eGFR >90      Calcium 9.7      Albumin 4.5      Alkaline Phosphatase 57      Total Protein 7.6      AST 13      Bilirubin, Total 0.6      ALT 15     HUMAN CHORIONIC GONADOTROPIN, SERUM QUANTITATIVE - Normal    HCG, Beta-Quantitative <2      Narrative:      Total HCG measurement is performed using the [employer]   Immunoassay which detects intact HCG and free beta HCG subunit.    This test is not indicated for use as a tumor marker.   HCG testing is performed using a different test methodology at [facility]   " [facility] than other [facility] hospitals. Direct result comparison   should only be made within the same method.       MAGNESIUM - Normal    Magnesium 1.96     SARS-COV-2 PCR - Normal    Coronavirus 2019, PCR Not Detected      Narrative:     This assay has received FDA Emergency Use Authorization (EUA) and is only authorized for the duration of time that circumstances exist to justify the authorization of the emergency use of in vitro diagnostic tests for the detection of SARS-CoV-2 virus and/or diagnosis of COVID-19 infection under section 564(b)(1) of the Act, 21 U.S.C. 360bbb-3(b)(1). This assay is an in vitro diagnostic nucleic acid amplification test for the qualitative detection of SARS-CoV-2 from nasopharyngeal specimens and has been validated for use at [facility]. Negative results do not preclude COVID-19 infections and should not be used as the sole basis for diagnosis, treatment, or other management decisions.     TROPONIN I, HIGH SENSITIVITY - Normal    Troponin I, High Sensitivity <3      Narrative:     Less than 99th percentile of normal range cutoff-  Female and children under 18 years old <14 ng/L; Male <21 ng/L: Negative  Repeat testing should be performed if clinically indicated.     Female and children under 18 years old 14-50 ng/L; Male 21-50 ng/L:  Consistent with possible cardiac damage and possible increased clinical   risk. Serial measurements may help to assess extent of myocardial damage.     >50 ng/L: Consistent with cardiac damage, increased clinical risk and  myocardial infarction. Serial measurements may help assess extent of   myocardial damage.      NOTE: Children less than 1 year old may have higher baseline troponin   levels and results should be interpreted in conjunction with the overall   clinical context.     NOTE: Troponin I testing is performed using a different   testing methodology at [facility] than at other   [facility]. Direct  result comparisons should only   be made within the same method.       CT head wo IV contrast   Final Result   No acute intracranial pathology.                  Signed by: Triston Santos 7/8/2024 3:38 PM   Dictation workstation:   [id number]      XR chest 1 view   Final Result   1.  No active cardiopulmonary disease.        Signed by: Mitchell Holly 7/8/2024 2:48 PM   Dictation workstation:   [id number]          1. Syncope, unspecified syncope type

## 2024-07-08 NOTE — ED TRIAGE NOTES
The patient was seen and examined in triage.    History of Present Illness: The patient is a 19-year-old female presents emergency department due to syncopal episode today.  She reports that she was at work and she felt very lightheaded, nauseated, and sweaty.  She told her coworkers did not feel well.  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  She reports that this never has happened before.  She does feel better now than she did prior to passing out.  She denies chest pains or shortness of breath.  She has not had recent illness.  Denies any nausea, vomiting, diarrhea.  She denies any fever or chills.    Brief Physical Exam:  Exam is limited by the patient sitting in a chair in triage.   Heart: Regular rate and rhythm.   Lungs: Clear to auscultation bilaterally.   Abdomen: Soft, nondistended, normoactive bowel sounds, nontender     Plan: Appropriate labs and diagnostic imaging were ordered.      For the remainder of the patient's workup and ED course, please refer to the main ED provider note. We discussed need for diagnostic testing including laboratory studies and imaging.  We also discussed that they may be asked to wait in the waiting room while these tests are pending.  They understand that if they choose to leave without having the testing completed or resulted that we cannot rule out acute life threatening illnesses and the risks involved could lead to worsening condition, permanent disability or even death.      Disclaimer: This note was dictated by speech recognition. Minor errors in transcription may be present. Please call if questions.

## 2024-12-06 DIAGNOSIS — J30.9 ALLERGIC RHINITIS, UNSPECIFIED SEASONALITY, UNSPECIFIED TRIGGER: ICD-10-CM

## 2024-12-06 RX ORDER — CETIRIZINE HYDROCHLORIDE 10 MG/1
10 TABLET ORAL DAILY
Qty: 90 TABLET | Refills: 3 | Status: SHIPPED | OUTPATIENT
Start: 2024-12-06

## 2025-02-10 ENCOUNTER — APPOINTMENT (OUTPATIENT)
Dept: PEDIATRICS | Facility: CLINIC | Age: 20
End: 2025-02-10
Payer: COMMERCIAL

## 2025-02-10 VITALS
DIASTOLIC BLOOD PRESSURE: 79 MMHG | SYSTOLIC BLOOD PRESSURE: 124 MMHG | HEIGHT: 61 IN | BODY MASS INDEX: 24.43 KG/M2 | HEART RATE: 92 BPM | WEIGHT: 129.4 LBS

## 2025-02-10 DIAGNOSIS — H10.13 ALLERGIC CONJUNCTIVITIS OF BOTH EYES: ICD-10-CM

## 2025-02-10 DIAGNOSIS — R09.81 CHRONIC NASAL CONGESTION: ICD-10-CM

## 2025-02-10 DIAGNOSIS — N94.6 MENSTRUAL CRAMPS: ICD-10-CM

## 2025-02-10 DIAGNOSIS — G43.009 MIGRAINE WITHOUT AURA AND WITHOUT STATUS MIGRAINOSUS, NOT INTRACTABLE: ICD-10-CM

## 2025-02-10 DIAGNOSIS — J30.9 ALLERGIC RHINITIS, UNSPECIFIED SEASONALITY, UNSPECIFIED TRIGGER: ICD-10-CM

## 2025-02-10 DIAGNOSIS — K20.0 EOSINOPHILIC ESOPHAGITIS: ICD-10-CM

## 2025-02-10 DIAGNOSIS — Z00.129 ENCOUNTER FOR ROUTINE CHILD HEALTH EXAMINATION WITHOUT ABNORMAL FINDINGS: Primary | ICD-10-CM

## 2025-02-10 DIAGNOSIS — K22.2 ESOPHAGEAL STENOSIS: ICD-10-CM

## 2025-02-10 DIAGNOSIS — N94.6 DYSMENORRHEA: ICD-10-CM

## 2025-02-10 PROCEDURE — 3008F BODY MASS INDEX DOCD: CPT | Performed by: PEDIATRICS

## 2025-02-10 PROCEDURE — 90471 IMMUNIZATION ADMIN: CPT | Performed by: PEDIATRICS

## 2025-02-10 PROCEDURE — 99395 PREV VISIT EST AGE 18-39: CPT | Performed by: PEDIATRICS

## 2025-02-10 PROCEDURE — 90656 IIV3 VACC NO PRSV 0.5 ML IM: CPT | Performed by: PEDIATRICS

## 2025-02-10 PROCEDURE — 99213 OFFICE O/P EST LOW 20 MIN: CPT | Performed by: PEDIATRICS

## 2025-02-10 PROCEDURE — 90715 TDAP VACCINE 7 YRS/> IM: CPT | Performed by: PEDIATRICS

## 2025-02-10 PROCEDURE — 90472 IMMUNIZATION ADMIN EACH ADD: CPT | Performed by: PEDIATRICS

## 2025-02-10 PROCEDURE — 1036F TOBACCO NON-USER: CPT | Performed by: PEDIATRICS

## 2025-02-10 RX ORDER — NORGESTIMATE AND ETHINYL ESTRADIOL 0.25-0.035
1 KIT ORAL DAILY
Qty: 28 TABLET | Refills: 2 | Status: SHIPPED | OUTPATIENT
Start: 2025-02-10

## 2025-02-10 NOTE — PROGRESS NOTES
Subjective   History was provided by the  patient .  Anjali Harrell is a 19 y.o. female who is here for this well-child visit.    General health:   Patient Active Problem List   Diagnosis    Asthma    Dysmenorrhea    Esophageal stenosis    Allergic rhinitis    Allergic conjunctivitis of both eyes    Migraine without status migrainosus, not intractable    Eosinophilic esophagitis    Scoliosis        Current Issues:   Dysmenorrhea: on OCPs, not helping with cramps much, wondering about different medication  EoE: follows with GI, has had esophageal dilation procedures in past, not on any PPI currently   Chronic nasal congestion: all winter, wondering if allergies, doesn't seem related to an illness/infection  Gyn: not yet established   Needs labs drawn for vaccine titers for school program     Nutrition: eating well  Sleep: bedtime 10pm  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   Friends/Social: boyfriend of 5 months, good friends  Mental Health: feeling well   Safety:   Seatbelts: yes  Smoking/vaping/alcohol: social EtOH  Sexual activity: yes, +condoms        Past Medical History:   Diagnosis Date    Carbuncle, unspecified 2018    Recurrent boils    Chronic sinusitis, unspecified 2018    Frequent sinus infections    Concussion without loss of consciousness, initial encounter 2017    Concussion without loss of consciousness, initial encounter    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    Cutaneous abscess, unspecified 2018    Abscess    Displaced fracture (avulsion) of medial epicondyle of left humerus, initial encounter for closed fracture 2015    Fracture of medial epicondyle of left humerus    Juvenile osteochondrosis of tarsus, unspecified ankle 2019    Sever's disease    Personal history of diseases of the skin and subcutaneous tissue 2019    History of acne     , unspecified weeks of gestation ([facility])      " infant     Past Surgical History:   Procedure Laterality Date    OTHER SURGICAL HISTORY  2022    Esophageal dilation    TYMPANOSTOMY TUBE PLACEMENT  2015    Ear Pressure Equalization Tube     No family history on file.  Social History     Social History Narrative    Not on file         Objective   /79   Pulse 92   Ht 1.549 m (5' 1\")   Wt 58.7 kg (129 lb 6.4 oz)   BMI 24.45 kg/m²   Physical Exam  Constitutional:       Appearance: Normal appearance.   HENT:      Head: Normocephalic and atraumatic.      Right Ear: Tympanic membrane and ear canal normal.      Left Ear: Tympanic membrane and ear canal normal.      Nose: Nose normal.      Mouth/Throat:      Mouth: Mucous membranes are moist.      Pharynx: Oropharynx is clear.   Eyes:      Conjunctiva/sclera: Conjunctivae normal.      Pupils: Pupils are equal, round, and reactive to light.   Cardiovascular:      Rate and Rhythm: Normal rate and regular rhythm.      Pulses: Normal pulses.      Heart sounds: Normal heart sounds. No murmur heard.  Pulmonary:      Effort: Pulmonary effort is normal.      Breath sounds: Normal breath sounds.   Abdominal:      General: There is no distension.      Palpations: Abdomen is soft.      Tenderness: There is no abdominal tenderness.   Musculoskeletal:         General: Normal range of motion.      Cervical back: Normal range of motion and neck supple.      Comments: No scoliosis on forward bend test    Lymphadenopathy:      Cervical: No cervical adenopathy.   Skin:     General: Skin is warm and dry.      Capillary Refill: Capillary refill takes less than 2 seconds.   Neurological:      General: No focal deficit present.      Mental Status: She is alert.   Psychiatric:         Mood and Affect: Mood normal.         Behavior: Behavior normal.         No questionnaires on file.      Assessment/Plan     Healthy 19 y.o. female here for [facility]    Growth and development WNL; BMI Facility age limit for growth %deangelo is " 20 years.      Immunizations: Tdap, flu    Discussed safe social choices, chronic nasal congestion, EoE, establishing with gyn    Problem List Items Addressed This Visit       Allergic conjunctivitis of both eyes    Allergic rhinitis    Dysmenorrhea     Change medication to Ortho-Cyclen         Relevant Medications    norgestimate-ethinyl estradiol (Ortho-Cyclen) 0.25-35 mg-mcg tablet    Eosinophilic esophagitis     Follows with GI, not currently on medication         Esophageal stenosis    Migraine without status migrainosus, not intractable     Other Visit Diagnoses       Encounter for routine child health examination without abnormal findings    -  Primary    Relevant Orders    Hepatitis B surface antibody    Rubeola Antibody, IgG    Mumps Antibody, IgG    Rubella Antibody, IgG    Varicella Zoster Antibody, IgG    T-Spot TB    Chronic nasal congestion        Relevant Orders    Referral to ENT

## 2025-02-24 ENCOUNTER — OFFICE VISIT (OUTPATIENT)
Dept: PEDIATRICS | Facility: CLINIC | Age: 20
End: 2025-02-24
Payer: COMMERCIAL

## 2025-02-24 VITALS — TEMPERATURE: 98.2 F | WEIGHT: 127 LBS | BODY MASS INDEX: 24 KG/M2

## 2025-02-24 DIAGNOSIS — J11.1 INFLUENZA-LIKE ILLNESS: ICD-10-CM

## 2025-02-24 DIAGNOSIS — J02.9 ACUTE PHARYNGITIS, UNSPECIFIED ETIOLOGY: Primary | ICD-10-CM

## 2025-02-24 LAB
POC FLU A RESULT: NEGATIVE
POC STREP A RESULT: NEGATIVE

## 2025-02-24 PROCEDURE — 87651 STREP A DNA AMP PROBE: CPT | Performed by: PEDIATRICS

## 2025-02-24 PROCEDURE — 87502 INFLUENZA DNA AMP PROBE: CPT | Performed by: PEDIATRICS

## 2025-02-24 PROCEDURE — 99213 OFFICE O/P EST LOW 20 MIN: CPT | Performed by: PEDIATRICS

## 2025-02-24 NOTE — PROGRESS NOTES
Subjective   Patient ID: Anjali Harrell is a 19 y.o. female who presents for Sore Throat.  HPI  Onset 2 days ago with ST  Yesterday entire body hurt- low grade fever, ST  Hard to get up yesterday  Cough, congestion and chills, migraine no vomiting or diarrhea  Lots of ill contacts- needs a doctor's note      Review of Systems    Objective   Physical Exam    Assessment/Plan            Saige Werner MD 02/24/25 4:22 PM

## 2025-03-13 ENCOUNTER — OFFICE VISIT (OUTPATIENT)
Dept: PEDIATRICS | Facility: CLINIC | Age: 20
End: 2025-03-13
Payer: COMMERCIAL

## 2025-03-13 VITALS — TEMPERATURE: 98 F | WEIGHT: 126 LBS | BODY MASS INDEX: 23.81 KG/M2

## 2025-03-13 DIAGNOSIS — J06.9 VIRAL URI WITH COUGH: Primary | ICD-10-CM

## 2025-03-13 LAB — POC STREP A RESULT: NEGATIVE

## 2025-03-13 PROCEDURE — 99213 OFFICE O/P EST LOW 20 MIN: CPT | Performed by: PEDIATRICS

## 2025-03-13 PROCEDURE — 87651 STREP A DNA AMP PROBE: CPT | Performed by: PEDIATRICS

## 2025-03-13 RX ORDER — PREDNISONE 20 MG/1
60 TABLET ORAL DAILY
Qty: 9 TABLET | Refills: 0 | Status: SHIPPED | OUTPATIENT
Start: 2025-03-13 | End: 2025-03-16

## 2025-03-13 ASSESSMENT — ENCOUNTER SYMPTOMS: SORE THROAT: 1

## 2025-03-13 NOTE — PROGRESS NOTES
Subjective   Patient ID: Anjali Harrell is a 19 y.o. female who presents for Sore Throat.  History was provided by the  patient .    Sore Throat     Sick visit  Started 5 days ago   Body aches  Sore throat  Lymph nodes swollen  Coughing  Trouble breathing when lying flat   Albuterol used        ROS: a complete review of systems was obtained and was negative except for what was outlined in HPI    Objective   Temp 36.7 °C (98 °F)   Wt 57.2 kg (126 lb)   BMI 23.81 kg/m²   Physical Exam  HENT:      Head: Normocephalic.      Right Ear: Tympanic membrane normal.      Left Ear: Tympanic membrane normal.      Nose: Nose normal.      Mouth/Throat:      Mouth: Mucous membranes are moist.      Pharynx: Oropharynx is clear.   Eyes:      Conjunctiva/sclera: Conjunctivae normal.      Pupils: Pupils are equal, round, and reactive to light.   Cardiovascular:      Rate and Rhythm: Normal rate and regular rhythm.      Heart sounds: No murmur heard.  Pulmonary:      Effort: Pulmonary effort is normal.      Breath sounds: Normal breath sounds.   Musculoskeletal:      Cervical back: Neck supple.   Neurological:      Mental Status: She is alert.            Labs from last 96 hours:  Results for orders placed or performed in visit on 03/13/25 (from the past 96 hours)   POCT NOW STREP A manually resulted   Result Value Ref Range    POC Group A Strep PCR Negative Negative       Imaging from last 24 hours:  No results found.        Assessment/Plan   1. Viral URI with cough  POCT NOW STREP A manually resulted    predniSONE (Deltasone) 20 mg tablet        18 y/o F with flu-like illness.  Rapid strep negative.  Lungs clear.      Supportive care, prednisone for any asthmatic flare, discussed reasons to seek return care     Rancho Calloway MD

## 2025-03-19 ENCOUNTER — CLINICAL SUPPORT (OUTPATIENT)
Dept: PEDIATRICS | Facility: CLINIC | Age: 20
End: 2025-03-19
Payer: COMMERCIAL

## 2025-03-19 DIAGNOSIS — Z23 ENCOUNTER FOR IMMUNIZATION: ICD-10-CM

## 2025-03-19 PROBLEM — Q76.3 CONGENITAL SCOLIOSIS DUE TO CONGENITAL BONY MALFORMATION: Status: ACTIVE | Noted: 2025-03-19

## 2025-03-19 PROCEDURE — 90707 MMR VACCINE SC: CPT | Performed by: PEDIATRICS

## 2025-03-19 PROCEDURE — 90744 HEPB VACC 3 DOSE PED/ADOL IM: CPT | Performed by: PEDIATRICS

## 2025-03-19 PROCEDURE — 90472 IMMUNIZATION ADMIN EACH ADD: CPT | Performed by: PEDIATRICS

## 2025-03-19 PROCEDURE — 90471 IMMUNIZATION ADMIN: CPT | Performed by: PEDIATRICS

## 2025-03-19 PROCEDURE — 90716 VAR VACCINE LIVE SUBQ: CPT | Performed by: PEDIATRICS

## 2025-03-19 RX ORDER — IBUPROFEN 600 MG/1
1 TABLET ORAL EVERY 6 HOURS PRN
COMMUNITY
Start: 2024-06-04

## 2025-05-12 DIAGNOSIS — N94.6 MENSTRUAL CRAMPS: ICD-10-CM

## 2025-05-13 RX ORDER — NORGESTIMATE AND ETHINYL ESTRADIOL 0.25-0.035
1 KIT ORAL DAILY
Qty: 28 TABLET | Refills: 12 | Status: SHIPPED | OUTPATIENT
Start: 2025-05-13

## 2025-08-19 ENCOUNTER — OFFICE VISIT (OUTPATIENT)
Dept: PEDIATRICS | Facility: CLINIC | Age: 20
End: 2025-08-19
Payer: COMMERCIAL

## 2025-08-19 VITALS — WEIGHT: 127 LBS | HEIGHT: 62 IN | TEMPERATURE: 98 F | BODY MASS INDEX: 23.37 KG/M2

## 2025-08-19 DIAGNOSIS — L03.011 PARONYCHIA OF RIGHT THUMB: Primary | ICD-10-CM

## 2025-08-19 PROCEDURE — 87205 SMEAR GRAM STAIN: CPT

## 2025-08-19 PROCEDURE — 87075 CULTR BACTERIA EXCEPT BLOOD: CPT

## 2025-08-19 PROCEDURE — 87077 CULTURE AEROBIC IDENTIFY: CPT

## 2025-08-19 PROCEDURE — 87070 CULTURE OTHR SPECIMN AEROBIC: CPT

## 2025-08-19 PROCEDURE — 3008F BODY MASS INDEX DOCD: CPT | Performed by: PEDIATRICS

## 2025-08-19 PROCEDURE — 87186 SC STD MICRODIL/AGAR DIL: CPT

## 2025-08-19 PROCEDURE — 99214 OFFICE O/P EST MOD 30 MIN: CPT | Performed by: PEDIATRICS

## 2025-08-19 RX ORDER — CEPHALEXIN 500 MG/1
500 CAPSULE ORAL 3 TIMES DAILY
Qty: 21 CAPSULE | Refills: 0 | Status: SHIPPED | OUTPATIENT
Start: 2025-08-19 | End: 2025-08-26

## 2025-08-21 LAB
BACTERIA SPEC CULT: ABNORMAL
GRAM STN SPEC: ABNORMAL
GRAM STN SPEC: ABNORMAL